# Patient Record
Sex: MALE | Race: OTHER | Employment: FULL TIME | URBAN - METROPOLITAN AREA
[De-identification: names, ages, dates, MRNs, and addresses within clinical notes are randomized per-mention and may not be internally consistent; named-entity substitution may affect disease eponyms.]

---

## 2017-06-26 ENCOUNTER — HOSPITAL ENCOUNTER (EMERGENCY)
Age: 42
Discharge: HOME | End: 2017-06-26
Attending: EMERGENCY MEDICINE
Payer: SELF-PAY

## 2017-06-26 ENCOUNTER — APPOINTMENT (OUTPATIENT)
Dept: GENERAL RADIOLOGY | Age: 42
End: 2017-06-26
Attending: EMERGENCY MEDICINE
Payer: SELF-PAY

## 2017-06-26 VITALS
TEMPERATURE: 98.4 F | RESPIRATION RATE: 16 BRPM | HEIGHT: 71 IN | HEART RATE: 64 BPM | BODY MASS INDEX: 29.32 KG/M2 | WEIGHT: 209.44 LBS | SYSTOLIC BLOOD PRESSURE: 119 MMHG | DIASTOLIC BLOOD PRESSURE: 73 MMHG | OXYGEN SATURATION: 98 %

## 2017-06-26 DIAGNOSIS — S22.41XA CLOSED FRACTURE OF MULTIPLE RIBS OF RIGHT SIDE, INITIAL ENCOUNTER: Primary | ICD-10-CM

## 2017-06-26 PROCEDURE — 99284 EMERGENCY DEPT VISIT MOD MDM: CPT | Performed by: EMERGENCY MEDICINE

## 2017-06-26 PROCEDURE — 74011250637 HC RX REV CODE- 250/637: Performed by: EMERGENCY MEDICINE

## 2017-06-26 PROCEDURE — 71111 X-RAY EXAM RIBS/CHEST4/> VWS: CPT

## 2017-06-26 RX ORDER — METHOCARBAMOL 500 MG/1
500 TABLET, FILM COATED ORAL
Status: COMPLETED | OUTPATIENT
Start: 2017-06-26 | End: 2017-06-26

## 2017-06-26 RX ORDER — TRAMADOL HYDROCHLORIDE 50 MG/1
50 TABLET ORAL
Qty: 15 TAB | Refills: 0 | Status: SHIPPED | OUTPATIENT
Start: 2017-06-26

## 2017-06-26 RX ORDER — MELOXICAM 15 MG/1
15 TABLET ORAL DAILY
Qty: 10 TAB | Refills: 0 | Status: SHIPPED | OUTPATIENT
Start: 2017-06-26

## 2017-06-26 RX ORDER — METHOCARBAMOL 750 MG/1
750 TABLET, FILM COATED ORAL 4 TIMES DAILY
Qty: 30 TAB | Refills: 0 | Status: SHIPPED | OUTPATIENT
Start: 2017-06-26 | End: 2017-07-04

## 2017-06-26 RX ORDER — HYDROCODONE BITARTRATE AND ACETAMINOPHEN 5; 325 MG/1; MG/1
1 TABLET ORAL ONCE
Status: COMPLETED | OUTPATIENT
Start: 2017-06-26 | End: 2017-06-26

## 2017-06-26 RX ADMIN — HYDROCODONE BITARTRATE AND ACETAMINOPHEN 1 TABLET: 5; 325 TABLET ORAL at 08:21

## 2017-06-26 RX ADMIN — METHOCARBAMOL 500 MG: 500 TABLET ORAL at 08:21

## 2017-06-26 NOTE — PROGRESS NOTES
present at bedside during [facility] We-07-A [id number] Discharge Instructions, Prescriptions and Spirometer education assessment

## 2017-06-26 NOTE — ED NOTES
One side rail up, bed in low position,bed brakes on, call light within reach. Pt resting in bed,resp easy,VSS,family at bedside, belongs in reach. Offered restroom and change in position. Pt unable to urinate at this time. HIT representative at bedside.

## 2017-06-26 NOTE — ED PROVIDER NOTES
HPI Comments: Patient is a 52-year-old male who reports that 4 days ago he slipped on a flight of stairs following onto his right flank region. Reports pain over the ribs and posterior right flank. No significant abdominal pain or vomiting. Reports he is taking ibuprofen at home with limited relief. He has not noticed any significant bruising but does report it is difficult to take a deep breath. Patient is a 40 y.o. male presenting with fall. The history is provided by the patient. No  was used. Fall   Pertinent negatives include no fever, no abdominal pain, no nausea, no vomiting and no headaches. History reviewed. No pertinent past medical history. History reviewed. No pertinent surgical history. History reviewed. No pertinent family history. Social History     Social History    Marital status:      Spouse name: N/A    Number of children: N/A    Years of education: N/A     Occupational History    Not on file. Social History Main Topics    Smoking status: Not on file    Smokeless tobacco: Not on file    Alcohol use Not on file    Drug use: Not on file    Sexual activity: Not on file     Other Topics Concern    Not on file     Social History Narrative    No narrative on file         ALLERGIES: Review of patient's allergies indicates no known allergies. Review of Systems   Constitutional: Negative for fatigue and fever. HENT: Negative for sore throat. Eyes: Negative for pain. Respiratory: Negative for cough, chest tightness and shortness of breath. Cardiovascular: Negative for leg swelling. Gastrointestinal: Negative for abdominal pain, nausea and vomiting. Genitourinary: Negative for dysuria. Musculoskeletal: Negative for back pain. Right flank and chest wall pain   Neurological: Negative for syncope and headaches.        Vitals:    06/26/17 0712   BP: (!) 162/101   Pulse: 70   Resp: 16   Temp: 98.6 °F (37 °C)   SpO2: 97% Weight: 95 kg (209 lb 7 oz)   Height: 5' 10.87\" (1.8 m)            Physical Exam   Constitutional: He is oriented to person, place, and time. He appears well-developed and well-nourished. No distress. HENT:   Head: Normocephalic and atraumatic. Eyes: Conjunctivae and EOM are normal. Pupils are equal, round, and reactive to light. Neck: Normal range of motion. Neck supple. Cardiovascular: Normal rate, regular rhythm and normal heart sounds. Pulmonary/Chest: Effort normal and breath sounds normal. No respiratory distress. He has no wheezes. He has no rales. Right flank and chest wall tenderness. No significant bruising appreciated. Pain is mostly in the lower right posterior rib region. Abdominal: Soft. He exhibits no distension. There is no tenderness. There is no rebound. Musculoskeletal: Normal range of motion. He exhibits no edema or tenderness. Neurological: He is alert and oriented to person, place, and time. Skin: Skin is warm and dry. No rash noted. He is not diaphoretic. Psychiatric: He has a normal mood and affect. His behavior is normal.   Vitals reviewed. MDM  Number of Diagnoses or Management Options  Closed fracture of multiple ribs of right side, initial encounter: new and does not require workup  Diagnosis management comments: X-ray demonstrates fractures of ribs 10, 11 and 12 on the right side. Abdomen is nontender. Good breath sounds bilaterally with an oxygen saturation of 97% on room air. We'll discharge with incentive spirometer, Robaxin, tramadol and meloxicam. Reports improvement with treatment here in the ED. Return precautions discussed. Discharged home in stable condition.        Amount and/or Complexity of Data Reviewed  Tests in the radiology section of CPT®: ordered and reviewed (Xr Ribs Bi W Pa Chest 4 Vs    Result Date: 6/26/2017  PA chest x-ray, 4 oblique images left ribs, 4 oblique images right ribs INDICATION: Lower posterior rib pain after fall 3 days ago FINDINGS: Chest x-ray: No pneumothorax. No pulmonary infiltrate. Heart size normal. Right ribs: There is nondisplaced right lateral 10th, 11th, 12th rib fractures. Left RIBS: No displaced rib fracture.      IMPRESSION: Nondisplaced right posterior lateral 10th, 11th, 12th rib fractures.    )    Risk of Complications, Morbidity, and/or Mortality  Presenting problems: moderate  Diagnostic procedures: moderate  Management options: moderate    Patient Progress  Patient progress: improved    ED Course       Procedures

## 2017-06-26 NOTE — ED TRIAGE NOTES
C/o rt flank/rib pain after falling down a flight of stairs on Friday. No bruising present, difficult to take a deep breath.